# Patient Record
Sex: FEMALE | Race: WHITE | Employment: PART TIME | URBAN - METROPOLITAN AREA
[De-identification: names, ages, dates, MRNs, and addresses within clinical notes are randomized per-mention and may not be internally consistent; named-entity substitution may affect disease eponyms.]

---

## 2017-05-26 PROCEDURE — 88175 CYTOPATH C/V AUTO FLUID REDO: CPT | Performed by: OBSTETRICS & GYNECOLOGY

## 2017-05-26 PROCEDURE — 87624 HPV HI-RISK TYP POOLED RSLT: CPT | Performed by: OBSTETRICS & GYNECOLOGY

## 2018-08-07 PROCEDURE — 86256 FLUORESCENT ANTIBODY TITER: CPT | Performed by: INTERNAL MEDICINE

## 2018-08-07 PROCEDURE — 82784 ASSAY IGA/IGD/IGG/IGM EACH: CPT | Performed by: INTERNAL MEDICINE

## 2018-08-07 PROCEDURE — 36415 COLL VENOUS BLD VENIPUNCTURE: CPT | Performed by: INTERNAL MEDICINE

## 2018-08-07 PROCEDURE — 83516 IMMUNOASSAY NONANTIBODY: CPT | Performed by: INTERNAL MEDICINE

## 2023-01-16 ENCOUNTER — HOSPITAL ENCOUNTER (EMERGENCY)
Facility: HOSPITAL | Age: 48
Discharge: HOME | End: 2023-01-16
Attending: EMERGENCY MEDICINE
Payer: COMMERCIAL

## 2023-01-16 VITALS
RESPIRATION RATE: 18 BRPM | WEIGHT: 120 LBS | SYSTOLIC BLOOD PRESSURE: 119 MMHG | DIASTOLIC BLOOD PRESSURE: 86 MMHG | HEIGHT: 61 IN | BODY MASS INDEX: 22.66 KG/M2 | HEART RATE: 76 BPM | OXYGEN SATURATION: 98 % | TEMPERATURE: 99 F

## 2023-01-16 DIAGNOSIS — N93.8 DYSFUNCTIONAL UTERINE BLEEDING: Primary | ICD-10-CM

## 2023-01-16 LAB
ALBUMIN SERPL-MCNC: 3.8 G/DL (ref 3.4–5)
ALBUMIN/GLOB SERPL: 1.2 {RATIO} (ref 1–2)
ALP LIVER SERPL-CCNC: 64 U/L
ALT SERPL-CCNC: 22 U/L
ANION GAP SERPL CALC-SCNC: 5 MMOL/L (ref 0–18)
AST SERPL-CCNC: 14 U/L (ref 15–37)
B-HCG UR QL: NEGATIVE
BASOPHILS # BLD AUTO: 0.05 X10(3) UL (ref 0–0.2)
BASOPHILS NFR BLD AUTO: 0.6 %
BILIRUB SERPL-MCNC: 0.4 MG/DL (ref 0.1–2)
BILIRUB UR QL STRIP.AUTO: NEGATIVE
BUN BLD-MCNC: 14 MG/DL (ref 7–18)
CALCIUM BLD-MCNC: 8.8 MG/DL (ref 8.5–10.1)
CHLORIDE SERPL-SCNC: 107 MMOL/L (ref 98–112)
CLARITY UR REFRACT.AUTO: CLEAR
CO2 SERPL-SCNC: 25 MMOL/L (ref 21–32)
COLOR UR AUTO: YELLOW
CREAT BLD-MCNC: 0.65 MG/DL
EOSINOPHIL # BLD AUTO: 0.13 X10(3) UL (ref 0–0.7)
EOSINOPHIL NFR BLD AUTO: 1.5 %
ERYTHROCYTE [DISTWIDTH] IN BLOOD BY AUTOMATED COUNT: 12.9 %
GFR SERPLBLD BASED ON 1.73 SQ M-ARVRAT: 110 ML/MIN/1.73M2 (ref 60–?)
GLOBULIN PLAS-MCNC: 3.2 G/DL (ref 2.8–4.4)
GLUCOSE BLD-MCNC: 102 MG/DL (ref 70–99)
GLUCOSE UR STRIP.AUTO-MCNC: NEGATIVE MG/DL
HCT VFR BLD AUTO: 36 %
HGB BLD-MCNC: 12 G/DL
IMM GRANULOCYTES # BLD AUTO: 0.03 X10(3) UL (ref 0–1)
IMM GRANULOCYTES NFR BLD: 0.4 %
KETONES UR STRIP.AUTO-MCNC: NEGATIVE MG/DL
LEUKOCYTE ESTERASE UR QL STRIP.AUTO: NEGATIVE
LYMPHOCYTES # BLD AUTO: 2.01 X10(3) UL (ref 1–4)
LYMPHOCYTES NFR BLD AUTO: 23.6 %
MCH RBC QN AUTO: 30.3 PG (ref 26–34)
MCHC RBC AUTO-ENTMCNC: 33.3 G/DL (ref 31–37)
MCV RBC AUTO: 90.9 FL
MONOCYTES # BLD AUTO: 0.57 X10(3) UL (ref 0.1–1)
MONOCYTES NFR BLD AUTO: 6.7 %
NEUTROPHILS # BLD AUTO: 5.72 X10 (3) UL (ref 1.5–7.7)
NEUTROPHILS # BLD AUTO: 5.72 X10(3) UL (ref 1.5–7.7)
NEUTROPHILS NFR BLD AUTO: 67.2 %
NITRITE UR QL STRIP.AUTO: NEGATIVE
OSMOLALITY SERPL CALC.SUM OF ELEC: 285 MOSM/KG (ref 275–295)
PH UR STRIP.AUTO: 7 [PH] (ref 5–8)
PLATELET # BLD AUTO: 262 10(3)UL (ref 150–450)
POTASSIUM SERPL-SCNC: 4.1 MMOL/L (ref 3.5–5.1)
PROT SERPL-MCNC: 7 G/DL (ref 6.4–8.2)
PROT UR STRIP.AUTO-MCNC: NEGATIVE MG/DL
RBC # BLD AUTO: 3.96 X10(6)UL
SODIUM SERPL-SCNC: 137 MMOL/L (ref 136–145)
SP GR UR STRIP.AUTO: 1.01 (ref 1–1.03)
UROBILINOGEN UR STRIP.AUTO-MCNC: 0.2 MG/DL
WBC # BLD AUTO: 8.5 X10(3) UL (ref 4–11)

## 2023-01-16 PROCEDURE — 99284 EMERGENCY DEPT VISIT MOD MDM: CPT

## 2023-01-16 PROCEDURE — 96360 HYDRATION IV INFUSION INIT: CPT

## 2023-01-16 PROCEDURE — 81025 URINE PREGNANCY TEST: CPT

## 2023-01-16 PROCEDURE — 80053 COMPREHEN METABOLIC PANEL: CPT | Performed by: EMERGENCY MEDICINE

## 2023-01-16 PROCEDURE — 81001 URINALYSIS AUTO W/SCOPE: CPT | Performed by: EMERGENCY MEDICINE

## 2023-01-16 PROCEDURE — 81015 MICROSCOPIC EXAM OF URINE: CPT | Performed by: EMERGENCY MEDICINE

## 2023-01-16 PROCEDURE — 85025 COMPLETE CBC W/AUTO DIFF WBC: CPT | Performed by: EMERGENCY MEDICINE

## 2023-01-16 RX ORDER — MEDROXYPROGESTERONE ACETATE 10 MG/1
20 TABLET ORAL 3 TIMES DAILY
Qty: 56 TABLET | Refills: 0 | Status: SHIPPED | OUTPATIENT
Start: 2023-01-16 | End: 2023-01-23

## 2023-01-16 NOTE — DISCHARGE INSTRUCTIONS
Take the Provera - 20 mg 3 times a day for 7 days, 20 mg once per day after that. Call Dr. Miky Serna' office to make a follow-up appointment with him or one of his partners within the next week.

## 2023-01-16 NOTE — ED INITIAL ASSESSMENT (HPI)
Patient with heavy vaginal bleeding for 3 days, sent by OB for eval. States going through tampon every 30 minutes

## 2023-08-02 ENCOUNTER — LAB REQUISITION (OUTPATIENT)
Dept: LAB | Facility: HOSPITAL | Age: 48
End: 2023-08-02
Payer: COMMERCIAL

## 2023-08-02 DIAGNOSIS — N93.9 ABNORMAL UTERINE AND VAGINAL BLEEDING, UNSPECIFIED: ICD-10-CM

## 2023-08-02 PROCEDURE — 88305 TISSUE EXAM BY PATHOLOGIST: CPT | Performed by: OBSTETRICS & GYNECOLOGY

## 2024-05-09 RX ORDER — ESCITALOPRAM OXALATE 5 MG/1
5 TABLET ORAL DAILY
COMMUNITY

## 2024-05-10 ENCOUNTER — TELEPHONE (OUTPATIENT)
Dept: MAMMOGRAPHY | Facility: HOSPITAL | Age: 49
End: 2024-05-10

## 2024-05-10 NOTE — TELEPHONE ENCOUNTER
Spoke briefly with patient. Not a good time to talk so she will call back on Monday. Contact information given.

## 2024-05-17 ENCOUNTER — TELEPHONE (OUTPATIENT)
Dept: MAMMOGRAPHY | Facility: HOSPITAL | Age: 49
End: 2024-05-17

## 2024-05-17 NOTE — TELEPHONE ENCOUNTER
Attempted to call patient re: breast wire localization procedure education. Message left for patient to call back.

## 2024-05-20 ENCOUNTER — TELEPHONE (OUTPATIENT)
Dept: MAMMOGRAPHY | Facility: HOSPITAL | Age: 49
End: 2024-05-20

## 2024-05-20 NOTE — TELEPHONE ENCOUNTER
Spoke with Brinda Edmonds regarding needle localization x2 process of breast for excision of mass x2 scheduled for 06-03-24 with Dr. Whitney. Procedure explained and all questions answered. Pt to be transported via W/C through [facility] to [facility] in [facility]. Pt verbalized understanding and had no further questions at this time.

## 2024-06-03 ENCOUNTER — HOSPITAL ENCOUNTER (OUTPATIENT)
Facility: HOSPITAL | Age: 49
Setting detail: HOSPITAL OUTPATIENT SURGERY
Discharge: HOME | End: 2024-06-03
Attending: SURGERY | Admitting: SURGERY
Payer: COMMERCIAL

## 2024-06-03 ENCOUNTER — ANESTHESIA (OUTPATIENT)
Dept: SURGERY | Facility: HOSPITAL | Age: 49
End: 2024-06-03
Payer: COMMERCIAL

## 2024-06-03 ENCOUNTER — HOSPITAL ENCOUNTER (OUTPATIENT)
Dept: MAMMOGRAPHY | Facility: HOSPITAL | Age: 49
Discharge: HOME | End: 2024-06-03
Attending: SURGERY | Admitting: SURGERY
Payer: COMMERCIAL

## 2024-06-03 ENCOUNTER — ANESTHESIA EVENT (OUTPATIENT)
Dept: SURGERY | Facility: HOSPITAL | Age: 49
End: 2024-06-03
Payer: COMMERCIAL

## 2024-06-03 VITALS
OXYGEN SATURATION: 98 % | TEMPERATURE: 98 F | HEART RATE: 79 BPM | HEIGHT: 61 IN | RESPIRATION RATE: 14 BRPM | BODY MASS INDEX: 24.92 KG/M2 | WEIGHT: 132 LBS | DIASTOLIC BLOOD PRESSURE: 71 MMHG | SYSTOLIC BLOOD PRESSURE: 106 MMHG

## 2024-06-03 DIAGNOSIS — N63.10 MASS OF RIGHT BREAST, UNSPECIFIED QUADRANT: Primary | ICD-10-CM

## 2024-06-03 DIAGNOSIS — N63.10 BREAST MASS, RIGHT: ICD-10-CM

## 2024-06-03 LAB — B-HCG UR QL: NEGATIVE

## 2024-06-03 PROCEDURE — 88307 TISSUE EXAM BY PATHOLOGIST: CPT | Performed by: SURGERY

## 2024-06-03 PROCEDURE — 88305 TISSUE EXAM BY PATHOLOGIST: CPT | Performed by: SURGERY

## 2024-06-03 PROCEDURE — 76098 X-RAY EXAM SURGICAL SPECIMEN: CPT | Performed by: SURGERY

## 2024-06-03 PROCEDURE — 81025 URINE PREGNANCY TEST: CPT

## 2024-06-03 PROCEDURE — 0HBT0ZZ EXCISION OF RIGHT BREAST, OPEN APPROACH: ICD-10-PCS | Performed by: SURGERY

## 2024-06-03 PROCEDURE — 19282 PERQ DEVICE BREAST EA IMAG: CPT | Performed by: SURGERY

## 2024-06-03 PROCEDURE — 19281 PERQ DEVICE BREAST 1ST IMAG: CPT | Performed by: SURGERY

## 2024-06-03 RX ORDER — DEXAMETHASONE SODIUM PHOSPHATE 4 MG/ML
VIAL (ML) INJECTION AS NEEDED
Status: DISCONTINUED | OUTPATIENT
Start: 2024-06-03 | End: 2024-06-03 | Stop reason: SURG

## 2024-06-03 RX ORDER — BUPIVACAINE HYDROCHLORIDE 5 MG/ML
INJECTION, SOLUTION EPIDURAL; INTRACAUDAL AS NEEDED
Status: DISCONTINUED | OUTPATIENT
Start: 2024-06-03 | End: 2024-06-03 | Stop reason: HOSPADM

## 2024-06-03 RX ORDER — ACETAMINOPHEN 500 MG
1000 TABLET ORAL ONCE AS NEEDED
Status: DISCONTINUED | OUTPATIENT
Start: 2024-06-03 | End: 2024-06-03

## 2024-06-03 RX ORDER — DIAZEPAM 5 MG/1
5 TABLET ORAL AS NEEDED
Status: DISCONTINUED | OUTPATIENT
Start: 2024-06-03 | End: 2024-06-03 | Stop reason: HOSPADM

## 2024-06-03 RX ORDER — SODIUM CHLORIDE, SODIUM LACTATE, POTASSIUM CHLORIDE, CALCIUM CHLORIDE 600; 310; 30; 20 MG/100ML; MG/100ML; MG/100ML; MG/100ML
INJECTION, SOLUTION INTRAVENOUS CONTINUOUS
Status: DISCONTINUED | OUTPATIENT
Start: 2024-06-03 | End: 2024-06-03

## 2024-06-03 RX ORDER — ONDANSETRON 2 MG/ML
INJECTION INTRAMUSCULAR; INTRAVENOUS AS NEEDED
Status: DISCONTINUED | OUTPATIENT
Start: 2024-06-03 | End: 2024-06-03 | Stop reason: SURG

## 2024-06-03 RX ORDER — ONDANSETRON 2 MG/ML
4 INJECTION INTRAMUSCULAR; INTRAVENOUS EVERY 6 HOURS PRN
Status: DISCONTINUED | OUTPATIENT
Start: 2024-06-03 | End: 2024-06-03

## 2024-06-03 RX ORDER — NALOXONE HYDROCHLORIDE 0.4 MG/ML
0.08 INJECTION, SOLUTION INTRAMUSCULAR; INTRAVENOUS; SUBCUTANEOUS AS NEEDED
Status: DISCONTINUED | OUTPATIENT
Start: 2024-06-03 | End: 2024-06-03

## 2024-06-03 RX ORDER — LIDOCAINE HYDROCHLORIDE AND EPINEPHRINE 10; 10 MG/ML; UG/ML
INJECTION, SOLUTION INFILTRATION; PERINEURAL AS NEEDED
Status: DISCONTINUED | OUTPATIENT
Start: 2024-06-03 | End: 2024-06-03 | Stop reason: HOSPADM

## 2024-06-03 RX ORDER — SCOLOPAMINE TRANSDERMAL SYSTEM 1 MG/1
1 PATCH, EXTENDED RELEASE TRANSDERMAL ONCE
Status: DISCONTINUED | OUTPATIENT
Start: 2024-06-03 | End: 2024-06-03 | Stop reason: HOSPADM

## 2024-06-03 RX ORDER — MEPERIDINE HYDROCHLORIDE 25 MG/ML
12.5 INJECTION INTRAMUSCULAR; INTRAVENOUS; SUBCUTANEOUS AS NEEDED
Status: DISCONTINUED | OUTPATIENT
Start: 2024-06-03 | End: 2024-06-03

## 2024-06-03 RX ORDER — HYDROCODONE BITARTRATE AND ACETAMINOPHEN 5; 325 MG/1; MG/1
1 TABLET ORAL ONCE AS NEEDED
Status: DISCONTINUED | OUTPATIENT
Start: 2024-06-03 | End: 2024-06-03

## 2024-06-03 RX ORDER — HYDROCODONE BITARTRATE AND ACETAMINOPHEN 5; 325 MG/1; MG/1
2 TABLET ORAL ONCE AS NEEDED
Status: DISCONTINUED | OUTPATIENT
Start: 2024-06-03 | End: 2024-06-03

## 2024-06-03 RX ORDER — HYDROMORPHONE HYDROCHLORIDE 1 MG/ML
0.4 INJECTION, SOLUTION INTRAMUSCULAR; INTRAVENOUS; SUBCUTANEOUS EVERY 5 MIN PRN
Status: DISCONTINUED | OUTPATIENT
Start: 2024-06-03 | End: 2024-06-03

## 2024-06-03 RX ORDER — DIPHENHYDRAMINE HYDROCHLORIDE 50 MG/ML
12.5 INJECTION INTRAMUSCULAR; INTRAVENOUS AS NEEDED
Status: DISCONTINUED | OUTPATIENT
Start: 2024-06-03 | End: 2024-06-03

## 2024-06-03 RX ORDER — HYDROMORPHONE HYDROCHLORIDE 1 MG/ML
0.2 INJECTION, SOLUTION INTRAMUSCULAR; INTRAVENOUS; SUBCUTANEOUS EVERY 5 MIN PRN
Status: DISCONTINUED | OUTPATIENT
Start: 2024-06-03 | End: 2024-06-03

## 2024-06-03 RX ORDER — ACETAMINOPHEN 500 MG
1000 TABLET ORAL ONCE
Status: DISCONTINUED | OUTPATIENT
Start: 2024-06-03 | End: 2024-06-03 | Stop reason: HOSPADM

## 2024-06-03 RX ORDER — HYDROCODONE BITARTRATE AND ACETAMINOPHEN 5; 325 MG/1; MG/1
1 TABLET ORAL EVERY 4 HOURS PRN
Qty: 20 TABLET | Refills: 0 | Status: SHIPPED | OUTPATIENT
Start: 2024-06-03

## 2024-06-03 RX ORDER — PROCHLORPERAZINE EDISYLATE 5 MG/ML
5 INJECTION INTRAMUSCULAR; INTRAVENOUS EVERY 8 HOURS PRN
Status: DISCONTINUED | OUTPATIENT
Start: 2024-06-03 | End: 2024-06-03

## 2024-06-03 RX ORDER — MIDAZOLAM HYDROCHLORIDE 1 MG/ML
INJECTION INTRAMUSCULAR; INTRAVENOUS AS NEEDED
Status: DISCONTINUED | OUTPATIENT
Start: 2024-06-03 | End: 2024-06-03 | Stop reason: SURG

## 2024-06-03 RX ORDER — HYDROMORPHONE HYDROCHLORIDE 1 MG/ML
0.6 INJECTION, SOLUTION INTRAMUSCULAR; INTRAVENOUS; SUBCUTANEOUS EVERY 5 MIN PRN
Status: DISCONTINUED | OUTPATIENT
Start: 2024-06-03 | End: 2024-06-03

## 2024-06-03 RX ADMIN — SODIUM CHLORIDE, SODIUM LACTATE, POTASSIUM CHLORIDE, CALCIUM CHLORIDE: 600; 310; 30; 20 INJECTION, SOLUTION INTRAVENOUS at 11:17:00

## 2024-06-03 RX ADMIN — DEXAMETHASONE SODIUM PHOSPHATE 8 MG: 4 MG/ML VIAL (ML) INJECTION at 10:41:00

## 2024-06-03 RX ADMIN — ONDANSETRON 4 MG: 2 INJECTION INTRAMUSCULAR; INTRAVENOUS at 10:41:00

## 2024-06-03 RX ADMIN — MIDAZOLAM HYDROCHLORIDE 2 MG: 1 INJECTION INTRAMUSCULAR; INTRAVENOUS at 10:23:00

## 2024-06-03 NOTE — ANESTHESIA PREPROCEDURE EVALUATION
PRE-OP EVALUATION    Patient Name: Brinda Edmonds    Admit Diagnosis: Breast mass, right [N63.10]    Pre-op Diagnosis: RIGHT BREAST MASS X2    RIGHT BREAST WIRE LOCALIZATION TIMES TWO AND EXCISION OF RIGHT BREAST MASS TWO SITES    Anesthesia Procedure: RIGHT BREAST WIRE LOCALIZATION TIMES TWO AND EXCISION OF RIGHT BREAST MASS TWO SITES (Right: Breast)    Surgeons and Role:     * Kit Whitney MD - Primary    Pre-op vitals reviewed.  Temp: 98.2 °F (36.8 °C)  Pulse: 68  Resp: 16  BP: 136/90  SpO2: 98 %  Body mass index is 23.62 kg/m².    Current medications reviewed.  Hospital Medications:  • [Transfer Hold] acetaminophen (Tylenol Extra Strength) tab 1,000 mg  1,000 mg Oral Once   • [Transfer Hold] scopolamine (Transderm-Scop) 1 MG/3DAYS patch 1 patch  1 patch Transdermal Once   • lactated ringers infusion   Intravenous Continuous   • [Transfer Hold] diazePAM (Valium) tab 5 mg  5 mg Oral PRN       Outpatient Medications:     Medications Prior to Admission   Medication Sig Dispense Refill Last Dose   • escitalopram 5 MG Oral Tab Take 1 tablet (5 mg total) by mouth daily.   Past Month   • Multiple Vitamins-Minerals (MULTIVITAL OR) Take by mouth.   6/2/2024   • FLUoxetine 10 MG Oral Cap Take one capsule by mouth daily starting 1 week prior to and until the first day of period. (Patient taking differently: Take 1 capsule (10 mg total) by mouth daily.) 30 capsule 3 6/2/2024   • Fluticasone Propionate 50 MCG/ACT Nasal Suspension 2 sprays by Nasal route once daily. 3 Bottle 3 6/2/2024   • Pseudoephedrine HCl ER (SUDAFED 12 HOUR) 120 MG Oral Tablet 12 Hr Take 1 tablet (120 mg total) by mouth daily. 90 tablet 3 6/2/2024   • loratadine 10 MG Oral Tab Take 1 tablet (10 mg total) by mouth daily. 90 tablet 3 6/2/2024       Allergies: Amoxicillin, Ceftin [cefuroxime], Emcin clear, and Sulfa antibiotics      Anesthesia Evaluation    Patient summary reviewed.    Anesthetic Complications  (-) history of anesthetic  complications         GI/Hepatic/Renal    Negative GI/hepatic/renal ROS.                             Cardiovascular    Negative cardiovascular ROS.    Exercise tolerance: good     MET: >4                                           Endo/Other    Negative endo/other ROS.                              Pulmonary    Negative pulmonary ROS.                       Neuro/Psych    Negative neuro/psych ROS.                              Past Surgical History:   Procedure Laterality Date   • Excisional biopsy left     • Needle biopsy left     • Other surgical history      left breast cystic removed   • Other surgical history      anual fis.   • Other surgical history      egg harvest     Social History     Socioeconomic History   • Marital status:    Tobacco Use   • Smoking status: Former     Current packs/day: 0.00     Average packs/day: 1 pack/day for 10.0 years (10.0 ttl pk-yrs)     Types: Cigarettes     Start date: 1990     Quit date: 2000     Years since quittin.4   • Smokeless tobacco: Never   Vaping Use   • Vaping status: Never Used   Substance and Sexual Activity   • Alcohol use: Yes     Alcohol/week: 3.0 - 5.0 standard drinks of alcohol     Types: 3 - 5 Glasses of wine per week   • Drug use: No   • Sexual activity: Yes     Partners: Male     Birth control/protection: Vasectomy   Other Topics Concern   • Seat Belt Yes     History   Drug Use No     Available pre-op labs reviewed.               Airway      Mallampati: II  Mouth opening: 3 FB  TM distance: 4 - 6 cm  Neck ROM: full Cardiovascular    Cardiovascular exam normal.         Dental    Dentition appears grossly intact         Pulmonary    Pulmonary exam normal.                 Other findings        ASA: 2   Plan: general  NPO status verified and patient meets guidelines.  Patient has not taken beta blockers in last 24 hours.      Comment: GA with LMA/ETT. Risks discussed including sore throat, hoarse voice, dental trauma,  nausea/vomiting  Plan/risks discussed with: patient and spouse            Present on Admission:  **None**

## 2024-06-03 NOTE — DISCHARGE INSTRUCTIONS
Home Care Instructions  BREAST BIOPSY      1. You may have an ace wrap around your chest. This may be removed in 24 hours. You have a clear plastic dressing called a “Tegaderm” covering your incision. You may shower or bathe right over this. Leave this in place until you are seen back in the office. You may note some drainage underneath this dressing. Pink or bloody discharge is to be expected and is acceptable in small amounts.    2. Anticipate some mild to moderate swelling and bruising. This is normal. If this appears excessive please contact the office    3. The pathology or biopsy results take 2-3 days to process. Please call the office 2 days after surgery for the results and further instructions will be given. Also at that time make an appointment for approximately one week after surgery. At this office visit the dressing will be removed, the wound inspected, and the stitches removed. Also during this visit there will be a complete discussion regarding the biopsy results.    4. You have been given a prescription for a pain reliever. Please take this as necessary. If you are having minimal discomfort, regular Tylenol or aspirin could be used as necessary. You may or may not choose to continue to wear your bra. Please use according to your own comfort level. You may start/continue with NSAIDS (antiinflammatories) in 2 days or as directed     5. You may eat a diet as tolerated    6. Signs and symptoms of wound problems include a yellowish or malodorous drainage underneath the bandage, increasing redness, or increasing pain and tenderness. If these occur please call immediately.    7. Please call if you have any problems or questions. One of my partners or I will be available at anytime day or night by calling our office number       [facility]

## 2024-06-03 NOTE — IMAGING NOTE
Assisted Dr. Freitas with needle localization of right 2 site breast for lumpectomy. Procedure explained and all questions answered. Pt verbalized understanding. Emotional support provided and pt tolerated procedure well with minimal discomfort. Wire(s) secured with gauze dressing.  Pt transported to OR holding via W/C with wire intact.

## 2024-06-03 NOTE — H&P
Brinda Edmonds is a 48 year old female who presents for a breast evaluation   Recent mammogram reveals new calcifications right breast  Core biopsy reveals ADH, LCIS,ALH    Past Medical History:   Diagnosis Date   Chronic rhinitis   COVID 2022   no residual effects   SEASONAL ALLERGIES     Past Surgical History:   Procedure Laterality Date   BUNIONECTOMY Bilateral 2019   EXCISIONAL BIOPSY LEFT    HYSTEROSCOPY,WITH REMOVAL 02/10/2023   D&C   NEEDLE BIOPSY LEFT    OTHER SURGICAL HISTORY   left breast cystic removed   OTHER SURGICAL HISTORY   anal fissure   OTHER SURGICAL HISTORY   egg harvest     Current Outpatient Medications   Medication Sig Dispense Refill   predniSONE 20 MG Oral Tab Take 1 tablet (20 mg total) by mouth 2 (two) times daily. 10 tablet 0   Ascorbic Acid (VITAMIN C OR) Take by mouth.   Multiple Vitamins-Minerals (MULTIVITAL OR) Take by mouth.   Fluticasone Propionate 50 MCG/ACT Nasal Suspension 2 sprays by Nasal route once daily. 3 Bottle 3   Pseudoephedrine HCl ER (SUDAFED 12 HOUR) 120 MG Oral Tablet 12 Hr Take 1 tablet (120 mg total) by mouth daily. 90 tablet 3   loratadine 10 MG Oral Tab Take 1 tablet (10 mg total) by mouth daily. 90 tablet 3     Sulfa Antibiotics HIVES  Amoxicillin NAUSEA ONLY  Ceftin [Cefuroxime] DIARRHEA  Erythromycin NAUSEA ONLY    Family History   Problem Relation Age of Onset   Dementia Father   Cancer Father   Bladder   Breast Cancer Mother 54   dx age 54   Cancer Maternal Grandmother   Heart Disease Maternal Grandfather   Heart Disease Paternal Grandfather     Social History  Tobacco Use  Smoking status: Former  Packs/day: 1.00  Years: 10.00  Additional pack years: 0.00  Total pack years: 10.00  Types: Cigarettes  Quit date: 2000  Years since quittin.2  Smokeless tobacco: Never  Vaping Use  Vaping Use: Never used  Alcohol use: Yes  Alcohol/week: 3.0 - 5.0 standard drinks of alcohol  Types: 3 - 5 Glasses of wine per week  Comment: weekly  Drug  use: No    ROS:    10 point review performed with pertinent positives and negatives per HPI    EXAM:    GENERAL: well developed, well nourished female, in no apparent distress  SKIN: anicteric  HEENT: normocephalic; sclera anicteric  NECK: supple, no JVD  RESPIRATORY: clear to auscultation  CARDIOVASCULAR: RRR  ABDOMEN: normal active BS, soft and no tenderness, no mass  LYMPHATIC: no lymphadenopathy  EXTREMITIES: no cyanosis or edema  BREASTS: no discrete or dominate mass either breast  Axilla negative    BREAST STEREO BIOPSY 1 SITE (CPT=19081)    Addendum Date: 3/22/2024   Pathology: Right breast, calcifications, 12:00, posterior, stereotactic needle core biopsy: -Atypical ductal hyperplasia (ADH). -Lobular carcinoma in situ(LCIS) and atypical lobular hyperplasia(ALH) with microcalcifications. These findings are concordant. Final conclusion: Atypical ductal hyperplasia, lobular carcinoma in situ and atypical lobular hyperplasia identified in the right breast at the 12:00 position the posterior one third. Recommendation is surgical consultation. The patient was informed of these findings and recommendations. Localization should be performed under: Stereotactic guidance Patient was doing well with no complaints.     Result Date: 3/22/2024  DATE OF SERVICE: 03.19.2024 STEREOTACTIC BIOPSY CALCIFICATIONS RIGHT BREAST WITH POST PROCEDURE TWO VIEW MAMMOGRAM INDICATION: Calcifications on mammogram in the right breast at the 12:00 o'clock position in the posterior one. BIOPSY PROCEDURE: On 3/19/2020 patient's mammograms were reviewed. Procedure and complications were discussed with the patient prior to the biopsy procedure as well as other options including surgical excision. Complications including bleeding, infection and incomplete sampling were discussed. She was positioned on the stereotactic table by the technologist. Images were obtained. Stereotactic coordinates were calculated for the calcifications at the 12:00  o'clock position in theposterior one. The skin was cleansed. 5 mL of 1% lidocaine were placed at the skin surface and 5 mL of 2% lidocaine with epinephrine was injected into the deeper subcutaneous tissue. Multiple samples were obtained using the vacuum-assisted device. Specimen radiograph shows multiple calcifications in the samples. Right breast 12:00 position posterior one third: Cork clip Migration: None Location: Centrally and superiorly between the upper outer and upper inner quadrant     IMPRESSION: Successful vacuum assisted biopsy calcifications in the right breast at the 12:00 position in the posterior one third. The patient tolerated the procedure well without immediate complication and left the radiology department in stable condition. Once pathology is obtained, an addendum to my report will be made with the final recommendations. This was interpreted by Brinda Castaneda M.D.    DIAG MAMM,DIGITAL,UNI RT CLIP [facility] (CPT=77065)    Addendum Date: 3/22/2024   Pathology: Right breast, calcifications, 12:00, posterior, stereotactic needle core biopsy: -Atypical ductal hyperplasia (ADH). -Lobular carcinoma in situ(LCIS) and atypical lobular hyperplasia(ALH) with microcalcifications. These findings are concordant. Final conclusion: Atypical ductal hyperplasia, lobular carcinoma in situ and atypical lobular hyperplasia identified in the right breast at the 12:00 position the posterior one third. Recommendation is surgical consultation. The patient was informed of these findings and recommendations. Localization should be performed under: Stereotactic guidance Patient was doing well with no complaints.     Result Date: 3/22/2024  DATE OF SERVICE: 03.19.2024 STEREOTACTIC BIOPSY CALCIFICATIONS RIGHT BREAST WITH POST PROCEDURE TWO VIEW MAMMOGRAM INDICATION: Calcifications on mammogram in the right breast at the 12:00 o'clock position in the posterior one. BIOPSY PROCEDURE: On 3/19/2020 patient's mammograms were  reviewed. Procedure and complications were discussed with the patient prior to the biopsy procedure as well as other options including surgical excision. Complications including bleeding, infection and incomplete sampling were discussed. She was positioned on the stereotactic table by the technologist. Images were obtained. Stereotactic coordinates were calculated for the calcifications at the 12:00 o'clock position in theposterior one. The skin was cleansed. 5 mL of 1% lidocaine were placed at the skin surface and 5 mL of 2% lidocaine with epinephrine was injected into the deeper subcutaneous tissue. Multiple samples were obtained using the vacuum-assisted device. Specimen radiograph shows multiple calcifications in the samples. Right breast 12:00 position posterior one third: Cork clip Migration: None Location: Centrally and superiorly between the upper outer and upper inner quadrant     IMPRESSION: Successful vacuum assisted biopsy calcifications in the right breast at the 12:00 position in the posterior one third. The patient tolerated the procedure well without immediate complication and left the radiology department in stable condition. Once pathology is obtained, an addendum to my report will be made with the final recommendations. This was interpreted by Brinda Castaneda M.D.    CARDIAC CALCIUM SCORE (CPT=75571)    Result Date: 3/18/2024  DATE OF SERVICE: 03.16.2024 CARDIAC CALCIUM SCORE (CPT=75571) - 3/16/2024 2:00 PM CLINICAL INDICATION: Atypical chest pain. COMPARISON STUDY: None available. TECHNIQUE: 2.5 mm thick axial images were obtained through the heart within narrow field-of-view and EKG gating. Using independent workstation the calcium score was calculated. FINDINGS: The left main artery has a calcium score of 0. The left anterior descending artery has a calcium score of 0. The left circumflex artery has a score of 0. The right coronary artery has a calcium score of 0. The posterior descending artery  has a calcium score of 0. The total calcium score is 0. Additional noncardiac findings: The heart does not appear enlarged. No pleural or pericardial effusions. The visualized lungs are unremarkable.     IMPRESSION: The calcium score is 0.     TIFFANIE DIAG MAMM, DIGITAL, UNI RT (SFN=[id number])    Result Date: 3/15/2024  DATE OF SERVICE: 03.15.2024 RIGHT DIGITAL DIAGNOSTIC MAMMOGRAM TIFFANIE DIAG MAMM, DIGITAL, UNI RT (SXV=[id number]) CLINICAL INDICATION: 48 years-old-Female; callback for RIGHT breast calcifications COMPARISON: Prior available mammography dating back to 2018. TECHNIQUE: Right digital tomographic views were obtained. Images were checked with the [employer] CAD system. FINDINGS: Breast Density C: The breasts are heterogeneously dense, which may obscure small masses. There is a 0.4 cm group of amorphous calcifications within the posterior third upper central RIGHT breast. The calcifications are indeterminate and further evaluation with stereotactic biopsy is recommended. -------------------------------------------------------    IMPRESSION: 1. Indeterminate RIGHT breast calcifications. Recommend further evaluation with stereotactic RIGHT breast biopsy. -------------------------------------------------------- BI-RADS Final Assessment Category 4: Suspicious. Management Recommendation: Biopsy should be performed in the absence of clinical contraindication. Results and recommendations were discussed with the patient at the time of examination. The Department of radiology will coordinate scheduling the biopsy with the patient. --------------------------------------------------------- This study was interpreted by Darrell Samuels MD. -------------------------------------------------------     Mammogram reports and images reviewed with her    IMPRESSION:    Suspicious calcifications right breast  ADH,LCIS,and ALH on core biopsy  Second site right breast with papillom    PLAN:  Wire localization and excision 2 sites right breast  Details  of surgery reviewed

## 2024-06-03 NOTE — ANESTHESIA POSTPROCEDURE EVALUATION
[facility]    Brinda Edmonds Patient Status:  Hospital Outpatient Surgery   Age/Gender 48 year old female MRN [id number]   Location [facility] SURGERY Attending Kit Whitney MD   Hosp Day # 0 PCP Wiliam Ken MD       Anesthesia Post-op Note    RIGHT BREAST WIRE LOCALIZATION TIMES TWO AND EXCISION OF RIGHT BREAST MASS TWO SITES    Procedure Summary       Date: 06/03/24 Room / Location:  MAIN OR 04 / [facility] MAIN OR    Anesthesia Start: 1021 Anesthesia Stop:     Procedure: RIGHT BREAST WIRE LOCALIZATION TIMES TWO AND EXCISION OF RIGHT BREAST MASS TWO SITES (Right: Breast) Diagnosis: (RIGHT BREAST MASS X2)    Surgeons: Kit Whitney MD Anesthesiologist: Carlos Mejía MD    Anesthesia Type: general ASA Status: 2            Anesthesia Type: general    Vitals Value Taken Time   /71 06/03/24 1117   Temp 97.1 06/03/24 1119   Pulse 74 06/03/24 1118   Resp 17 06/03/24 1118   SpO2 100 % 06/03/24 1118   Vitals shown include unfiled device data.    Patient Location: PACU    Anesthesia Type: general    Airway Patency: patent and extubated    Postop Pain Control: adequate    Mental Status: mildly sedated but able to meaningfully participate in the post-anesthesia evaluation    Nausea/Vomiting: none    Cardiopulmonary/Hydration status: stable euvolemic    Complications: no apparent anesthesia related complications    Postop vital signs: stable    Dental Exam: Unchanged from Preop    Patient to be discharged from PACU when criteria met.

## 2024-06-03 NOTE — BRIEF OP NOTE
Pre-Operative Diagnosis: RIGHT BREAST MASS X2     Post-Operative Diagnosis: RIGHT BREAST MASS X2      Procedure Performed:   RIGHT BREAST WIRE LOCALIZATION TIMES TWO AND EXCISION OF RIGHT BREAST MASS TWO SITES    Surgeons and Role:     * Kit Whitney MD - Primary    Assistant(s):             Specimen: right breast specimen with 2 clips     Estimated Blood Loss: Blood Output: 5 mL (6/3/2024 10:54 AM)          Kit Whitney MD  6/3/2024  11:04 AM

## 2024-06-03 NOTE — ANESTHESIA PROCEDURE NOTES
Airway  Date/Time: 6/3/2024 10:28 AM  Urgency: elective      General Information and Staff    Patient location during procedure: OR  Anesthesiologist: Carlos Mejía MD  Performed: anesthesiologist   Performed by: Carlos Mejía MD  Authorized by: Carlos Mejía MD      Indications and Patient Condition  Indications for airway management: anesthesia  Spontaneous ventilation: present  Sedation level: deep  Preoxygenated: yes  Patient position: sniffing  Mask difficulty assessment: 1 - vent by mask    Final Airway Details  Final airway type: supraglottic airway      Successful airway: classic  Size 3       Number of attempts at approach: 1  Number of other approaches attempted: 0

## 2024-06-04 NOTE — OPERATIVE REPORT
[facility]    PATIENT'S NAME: JORDIN ARRIETA   ATTENDING PHYSICIAN: Kit Whitney M.D.   OPERATING PHYSICIAN: Kit Whitney M.D.   PATIENT ACCOUNT#:   [id number]    LOCATION:  [facility] 8 EDWP 10  MEDICAL RECORD #:   [id number]       YOB: 1975  ADMISSION DATE:       06/03/2024      OPERATION DATE:  06/03/2024    OPERATIVE REPORT      PREOPERATIVE DIAGNOSIS:  Right breast mass x2.  POSTOPERATIVE DIAGNOSIS:  Right breast mass x2.  PROCEDURE:  Wire localization and excision, right breast mass x2.    ASSISTANT:  IWONA Cano.  She assisted by prepping, draping, retracting, and suturing.    ANESTHESIA:  General.    INTRAOPERATIVE SPECIMEN:  Mammogram.    OPERATIVE TECHNIQUE:  Patient was brought in the operating room, placed on the operating table in supine position.  Inhalational anesthesia provided by the attending anesthesiologist.  The right breast was prepped in usual sterile fashion.  Then, 1% lidocaine and 0.5% Marcaine were used as a local anesthetic.  The patient had 2 wires in the breast, both in the superior portion of the breast, one at the 11 o'clock position, one at the 1 o'clock position.  I made 1 incision adjacent to both wires and excised the targeted specimen.  During the surgery, the wire at the 11 o'clock position was dislodged, but the area had been identified.  I excised both areas within 1 specimen.  I performed a mammogram of the specimen in the operating room which confirmed both marking clips and 1 residual wire.  The specimen was sent to Pathology for histological examination.  Hemostasis was obtained with the electrocautery.  The wound was irrigated with sterile saline.  I did a layered wound closure with a 3-0 and a 4-0 Vicryl.  Steri-Strips and sterile dressing were provided.  The patient tolerated the procedure well.  She was taken to recovery room for observation.    Dictated By Kit Whitney M.D.  d: 06/03/2024  11:07:41  t: 06/03/2024 18:51:30  [facility] [id number]  [facility]/

## (undated) DEVICE — COVER LT HNDL RIG FOR SUR CAM DISP

## (undated) DEVICE — SLEEVE COMPR MD KNEE LEN SGL USE KENDALL SCD

## (undated) DEVICE — E-Z CLEAN, NON-STICK, PTFE COATED, ELECTROSURGICAL BLADE ELECTRODE, MODIFIED EXTENDED INSULATION, 4 INCH (10.2 CM): Brand: MEGADYNE

## (undated) DEVICE — BREAST-HERNIA-PORT CDS-LF: Brand: MEDLINE INDUSTRIES, INC.

## (undated) DEVICE — SURG GL, SENSICARE PI ORTHO LT,LF,PF,8.5: Brand: MEDLINE

## (undated) DEVICE — Device

## (undated) DEVICE — SOLUTION IRRIG 1000ML 0.9% NACL USP BTL

## (undated) DEVICE — UNDYED BRAIDED (POLYGLACTIN 910), SYNTHETIC ABSORBABLE SUTURE: Brand: COATED VICRYL

## (undated) DEVICE — ANTIBACTERIAL UNDYED BRAIDED (POLYGLACTIN 910), SYNTHETIC ABSORBABLE SUTURE: Brand: COATED VICRYL

## (undated) DEVICE — SUT COAT VCRL + 2-0 18IN ABSRB UD ANTIBACT